# Patient Record
(demographics unavailable — no encounter records)

---

## 2025-06-13 NOTE — ASSESSMENT
[FreeTextEntry1] : 1) benign findings as above- education  2) Shave bx location right breast. left breast diagnosis: r/o ISK   Shave biopsy performed today over above location, risks and benefits discussed including incomplete removal, not enough tissue for diagnosis scarring and infection, informed consent obtained, pictures taken,  cleaned with alcohol and anesthetized with 1%lido+epi, 0.3 cc total, hemostasis obtained with cautery, vaseline and bandaid placed, tolerated well, wound care reviewed, specimen sent to pathology.

## 2025-06-13 NOTE — PHYSICAL EXAM
[FreeTextEntry3] : multiple benign nevi and lentigines + inflamed, waxy, keratotic papule on right breast and left breast